# Patient Record
Sex: FEMALE | Race: WHITE | NOT HISPANIC OR LATINO | Employment: UNEMPLOYED | URBAN - METROPOLITAN AREA
[De-identification: names, ages, dates, MRNs, and addresses within clinical notes are randomized per-mention and may not be internally consistent; named-entity substitution may affect disease eponyms.]

---

## 2017-09-19 ENCOUNTER — OFFICE VISIT (OUTPATIENT)
Dept: INTERNAL MEDICINE | Facility: CLINIC | Age: 52
End: 2017-09-19

## 2017-09-19 VITALS
DIASTOLIC BLOOD PRESSURE: 80 MMHG | BODY MASS INDEX: 26.2 KG/M2 | TEMPERATURE: 98.4 F | HEIGHT: 66 IN | WEIGHT: 163 LBS | SYSTOLIC BLOOD PRESSURE: 122 MMHG

## 2017-09-19 DIAGNOSIS — K42.9 UMBILICAL HERNIA WITHOUT OBSTRUCTION AND WITHOUT GANGRENE: ICD-10-CM

## 2017-09-19 DIAGNOSIS — J30.89 SEASONAL ALLERGIC RHINITIS DUE TO OTHER ALLERGIC TRIGGER: Primary | ICD-10-CM

## 2017-09-19 PROCEDURE — 99203 OFFICE O/P NEW LOW 30 MIN: CPT | Performed by: INTERNAL MEDICINE

## 2017-09-19 NOTE — PROGRESS NOTES
Subjective   Leyla Walsh is a 51 y.o. female here to establish care for seasonal allergies and umbilical hernia. Well-controlled on prn antihistamine. Umbilical hernia has been present for years, came with children (3). She is fairly active and likes to go hiking, but the hernia will become more pronounced and painful. It can also happen when bending down or flexing her abd muscles. Would like to get this fixed as it is impacting her life. It has always been easily reducible by shift in position by pt.    Review of Systems   Constitutional: Negative.    HENT: Negative.    Eyes: Negative.    Respiratory: Negative.    Cardiovascular: Negative.    Gastrointestinal:        Hernia   Endocrine: Negative.    Genitourinary: Negative.    Musculoskeletal: Negative.    Skin: Negative.    Allergic/Immunologic: Negative.    Neurological: Negative.    Hematological: Negative.    Psychiatric/Behavioral: Negative.        Past Medical History:   Diagnosis Date   • Cancer     basal cell x's3   • Fractures     R wrist   • Urinary tract infection      Family History   Problem Relation Age of Onset   • Hypertension Mother    • Hyperlipidemia Mother    • Cancer Father      basal cell   • Stroke Father    • Migraines Sister    • Stroke Maternal Grandmother    • Osteoporosis Maternal Grandmother      Past Surgical History:   Procedure Laterality Date   • BASAL CELL CARCINOMA EXCISION  11/2015    face   • BASAL CELL CARCINOMA EXCISION  10/2015    face     Social History     Social History   • Marital status:      Spouse name: N/A   • Number of children: N/A   • Years of education: N/A     Occupational History   • Not on file.     Social History Main Topics   • Smoking status: Never Smoker   • Smokeless tobacco: Never Used   • Alcohol use Yes   • Drug use: No   • Sexual activity: Yes     Partners: Male     Birth control/ protection: Condom     Other Topics Concern   • Not on file     Social History Narrative   • No narrative on file  "        Current Outpatient Prescriptions:   •  cetirizine (ZyrTEC) 10 MG tablet, Take 10 mg by mouth daily., Disp: , Rfl:     Objective   /80 (BP Location: Right arm, Patient Position: Sitting, Cuff Size: Adult)  Temp 98.4 °F (36.9 °C) (Temporal Artery )   Ht 66\" (167.6 cm)  Wt 163 lb (73.9 kg)  BMI 26.31 kg/m2  Physical Exam   Constitutional: She is oriented to person, place, and time. She appears well-developed and well-nourished.   HENT:   Head: Normocephalic and atraumatic.   Cardiovascular: Normal rate, regular rhythm and normal heart sounds.  Exam reveals no gallop and no friction rub.    No murmur heard.  Pulmonary/Chest: Effort normal and breath sounds normal. She has no wheezes.   Abdominal: Soft. She exhibits no distension. There is no tenderness. A hernia is present.   Small umbilical hernia present, easily reducible   Musculoskeletal:   Normal gait and station   Neurological: She is alert and oriented to person, place, and time.   Skin: Skin is warm and dry.   Psychiatric: She has a normal mood and affect. Her behavior is normal. Judgment and thought content normal.   Vitals reviewed.      Assessment/Plan   Leyla was seen today for establish care.    Diagnoses and all orders for this visit:    Seasonal allergic rhinitis due to other allergic trigger  -continue antihistamine prn    Umbilical hernia without obstruction and without gangrene  -     Ambulatory Referral to General Surgery           "

## 2024-08-12 ENCOUNTER — OFFICE VISIT (OUTPATIENT)
Dept: CARDIOLOGY | Facility: CLINIC | Age: 59
End: 2024-08-12
Payer: COMMERCIAL

## 2024-08-12 VITALS
OXYGEN SATURATION: 99 % | HEART RATE: 60 BPM | WEIGHT: 153.7 LBS | DIASTOLIC BLOOD PRESSURE: 70 MMHG | SYSTOLIC BLOOD PRESSURE: 140 MMHG | BODY MASS INDEX: 24.7 KG/M2 | HEIGHT: 66 IN

## 2024-08-12 DIAGNOSIS — I70.0 ATHEROSCLEROSIS OF ABDOMINAL AORTA: Primary | ICD-10-CM

## 2024-08-12 PROCEDURE — 99204 OFFICE O/P NEW MOD 45 MIN: CPT | Performed by: INTERNAL MEDICINE

## 2024-08-12 PROCEDURE — 93000 ELECTROCARDIOGRAM COMPLETE: CPT | Performed by: INTERNAL MEDICINE

## 2024-08-12 RX ORDER — OMEPRAZOLE 20 MG/1
CAPSULE, DELAYED RELEASE ORAL
COMMUNITY
Start: 2024-07-24

## 2024-08-12 RX ORDER — LISINOPRIL 10 MG/1
1 TABLET ORAL DAILY
COMMUNITY
Start: 2024-06-10

## 2024-08-12 RX ORDER — DIPHENOXYLATE HYDROCHLORIDE AND ATROPINE SULFATE 2.5; .025 MG/1; MG/1
1 TABLET ORAL DAILY
COMMUNITY

## 2024-08-12 NOTE — PROGRESS NOTES
"Chief Complaint  Establish Care (New Patient)      Subjective   History of Present Illness    Problem List  -Atherosclerosis of the aorta  -HTN  -HLD  -EKG, sinus steven    Mrs. Walsh is a 58 year old who is being seen for above.  Asymptomatic.  BP well controlled at home.  Incidental atherosclerosis of aorta noted on abdominal CT.  Recent lifestyle changes and lipids have improved.  ROS negative except for the above.        Objective   Vital Signs:  Vitals:    08/12/24 0953   BP: 140/70   Pulse: 60   SpO2: 99%     Estimated body mass index is 24.81 kg/m² as calculated from the following:    Height as of this encounter: 167.6 cm (66\").    Weight as of this encounter: 69.7 kg (153 lb 11.2 oz).       Physical Exam  HENT:      Head: Normocephalic.   Eyes:      Extraocular Movements: Extraocular movements intact.   Cardiovascular:      Rate and Rhythm: Normal rate and regular rhythm.      Heart sounds: No murmur heard.     No gallop.   Pulmonary:      Breath sounds: Normal breath sounds.   Abdominal:      Palpations: Abdomen is soft.   Musculoskeletal:      Right lower leg: No edema.      Left lower leg: No edema.   Skin:     General: Skin is warm and dry.   Neurological:      General: No focal deficit present.      Mental Status: She is alert.   Psychiatric:         Mood and Affect: Mood normal.           ECG 12 Lead    Date/Time: 8/12/2024 11:03 AM  Performed by: Yoandy Glass MD    Authorized by: Yoandy Glass MD  Rhythm: sinus bradycardia    Clinical impression: normal ECG       Clinic discussed advanced directive    Assessment   -Atherosclerosis of the aorta  -HTN  -HLD  -EKG, sinus steven    Plan   -AAA eval by US  -discussed CT calcium score, declined  -will get blood work from PCP office.  Recent lipid panel    Return in about 1 year (around 8/12/2025).  Yoandy Glass MD  08/12/2024 11:03 EDT     "

## 2024-08-27 ENCOUNTER — HOSPITAL ENCOUNTER (OUTPATIENT)
Facility: HOSPITAL | Age: 59
Discharge: HOME | End: 2024-08-27
Admitting: INTERNAL MEDICINE
Payer: COMMERCIAL

## 2024-08-27 DIAGNOSIS — I70.0 ATHEROSCLEROSIS OF ABDOMINAL AORTA: ICD-10-CM

## 2024-08-27 PROCEDURE — 76706 US ABDL AORTA SCREEN AAA: CPT

## 2024-11-04 ENCOUNTER — OFFICE VISIT (OUTPATIENT)
Dept: SLEEP MEDICINE | Age: 59
End: 2024-11-04
Payer: COMMERCIAL

## 2024-11-04 VITALS
HEIGHT: 66 IN | DIASTOLIC BLOOD PRESSURE: 80 MMHG | SYSTOLIC BLOOD PRESSURE: 128 MMHG | OXYGEN SATURATION: 100 % | BODY MASS INDEX: 25.01 KG/M2 | HEART RATE: 60 BPM | WEIGHT: 155.6 LBS | TEMPERATURE: 98.3 F

## 2024-11-04 DIAGNOSIS — I10 ESSENTIAL HYPERTENSION: ICD-10-CM

## 2024-11-04 DIAGNOSIS — G47.33 OBSTRUCTIVE SLEEP APNEA, ADULT: Primary | ICD-10-CM

## 2024-11-04 DIAGNOSIS — R06.83 SNORING: ICD-10-CM

## 2024-11-04 PROBLEM — K21.9 GASTROESOPHAGEAL REFLUX DISEASE WITHOUT ESOPHAGITIS: Status: ACTIVE | Noted: 2024-11-04

## 2024-11-04 PROCEDURE — 99244 OFF/OP CNSLTJ NEW/EST MOD 40: CPT | Performed by: INTERNAL MEDICINE

## 2024-11-04 NOTE — PROGRESS NOTES
Leyla Walsh is a 58 y.o. female.   Chief Complaint   Patient presents with    Snoring    Dry Mouth    Unable To Stay Asleep    new patient       HPI     58 y.o. female seen in consultation at the request of Mildred Cortes MD for evaluation of the above.     She is here primarily at the behest of her family and her dentist.  She has a longstanding history of loud snoring.  Her dentist has noted evidence of reflux and suspects obstructive sleep apnea based upon her oropharyngeal exam.  Her  notes heavy snoring but has not noted any apneas.    From the patient standpoint she typically is in bed from 11 PM until 6-7 a.m.  She fell asleep within 5 minutes and will sleep an average of 7 hours per night.  She never naps during the day.  She says her sleep is undisturbed but she describes sometimes awakening in early morning hours and having difficulty getting back to sleep.  She describes a stressful home situation with family.    She denies any RLS type symptoms.  She does have bruxism.  She does awaken with a dry mouth.  She is being treated for GERD and hypertension.    [place] Scale is: 5/24    The patient's relevant past medical, surgical, family, and social history reviewed and updated in Epic as appropriate.    Current medications are:   Current Outpatient Medications:     cetirizine (ZyrTEC) 10 MG tablet, Take 1 tablet by mouth As Needed., Disp: , Rfl:     lisinopril (PRINIVIL,ZESTRIL) 10 MG tablet, Take 1 tablet by mouth Daily., Disp: , Rfl:     multivitamin (MULTI-VITAMIN DAILY PO), Take 1 tablet by mouth Daily., Disp: , Rfl:     omeprazole (priLOSEC) 20 MG capsule, TAKE 1 CAPSULE BY MOUTH EVERY DAY FOR 30 DAYS AND THEN TAKE 1 CAPSULE BY MOUTH EVERY OTHER DAY UNTIL GONE, Disp: , Rfl: .    Review of Systems    Review of Systems  ROS documented in patient questionnaire ×14 systems.  Reviewed with patient.  Otherwise negative except as noted in HPI.    Physical Exam    Blood pressure 128/80, pulse  "60, temperature 98.3 °F (36.8 °C), temperature source Infrared, height 167.6 cm (66\"), weight 70.6 kg (155 lb 9.6 oz), SpO2 100%, not currently breastfeeding. Body mass index is 25.11 kg/m².    Physical Exam  Vitals and nursing note reviewed.   Constitutional:       Appearance: Normal appearance. She is well-developed.   HENT:      Head: Normocephalic and atraumatic.      Nose: Nose normal.      Mouth/Throat:      Mouth: Mucous membranes are moist.      Pharynx: Oropharynx is clear. No oropharyngeal exudate.      Comments: Class II airway  Broad tongue  Eyes:      General: No scleral icterus.     Conjunctiva/sclera: Conjunctivae normal.   Neck:      Thyroid: No thyromegaly.      Trachea: No tracheal deviation.   Cardiovascular:      Rate and Rhythm: Normal rate and regular rhythm.      Heart sounds: No murmur heard.     No friction rub. No gallop.   Pulmonary:      Effort: Pulmonary effort is normal. No respiratory distress.      Breath sounds: No wheezing or rales.   Musculoskeletal:         General: No deformity. Normal range of motion.   Skin:     General: Skin is warm and dry.      Findings: No rash.   Neurological:      Mental Status: She is alert and oriented to person, place, and time.   Psychiatric:         Behavior: Behavior normal.         Thought Content: Thought content normal.         DATA:    Reviewed 7/24/2024 note from Dr. Cortes    Reviewed bed partner questionnaire    ASSESSMENT:    Problem List Items Addressed This Visit       Essential hypertension     Other Visit Diagnoses       Obstructive sleep apnea, adult    -  Primary    Relevant Orders    Home Sleep Study    Snoring        Relevant Orders    Home Sleep Study            58-year-old female referred to the sleep center due to the suspicion of sleep disordered breathing/obstructive sleep apnea.  She has been noted to snore loudly but is mostly asymptomatic otherwise.  I do note that she does have some disturbed sleep that has features of sleep " maintenance insomnia.  She does have an elevated STOP-BANG score of 3 putting her at high risk for the presence of obstructive sleep apnea based primarily upon her loud snoring, age, and hypertension.    I discussed the possibility of obstructive sleep apnea and how this may be affecting her short and long-term health and she was agreeable to proceed with testing and we discussed treatment options as well.  Details as below.    PLAN:    - Home sleep apnea testing.  - Diagnostic process and potential treatment options discussed and questions/concerns addressed.  - Long-term cardiovascular and metabolic risks of untreated obstructive sleep apnea reviewed with the patient.  - Patient was agreeable to a trial of CPAP therapy on an initial trial basis if recommended after testing complete.  - Sleep center follow-up.    I have reviewed the results of my evaluation and impression and discussed my recommendations in detail with the patient.    Signed by  Sylvester Luo MD    November 4, 2024      CC: Mildred Cortes MD Henkel, Mary H, MD

## 2025-01-08 ENCOUNTER — HOSPITAL ENCOUNTER (OUTPATIENT)
Dept: SLEEP MEDICINE | Facility: HOSPITAL | Age: 60
Discharge: HOME | End: 2025-01-08
Admitting: INTERNAL MEDICINE
Payer: COMMERCIAL

## 2025-01-08 VITALS — HEIGHT: 66 IN | BODY MASS INDEX: 24.91 KG/M2 | WEIGHT: 155 LBS

## 2025-01-08 DIAGNOSIS — R06.83 SNORING: ICD-10-CM

## 2025-01-08 DIAGNOSIS — G47.33 OBSTRUCTIVE SLEEP APNEA, ADULT: ICD-10-CM

## 2025-01-08 PROCEDURE — G0399 HOME SLEEP TEST/TYPE 3 PORTA: HCPCS

## 2025-02-04 ENCOUNTER — TELEPHONE (OUTPATIENT)
Dept: SLEEP MEDICINE | Age: 60
End: 2025-02-04
Payer: COMMERCIAL

## 2025-02-07 ENCOUNTER — TELEPHONE (OUTPATIENT)
Dept: SLEEP MEDICINE | Age: 60
End: 2025-02-07
Payer: COMMERCIAL

## 2025-02-07 DIAGNOSIS — G47.33 SEVERE OBSTRUCTIVE SLEEP APNEA: Primary | ICD-10-CM

## 2025-02-07 NOTE — TELEPHONE ENCOUNTER
Patient called in to ask if she could switch the location for her order of the c pap machine and supplies.  She states that she would like the order to go to   [facility]  [address] [place] [address] [address], [place] [address] [phone]

## 2025-05-07 NOTE — PROGRESS NOTES
Sleep Clinic Follow Up Note    Chief Complaint  Sleeping Problem and Sleep Apnea (Follow up)    Subjective     History of Present Illness (from previous encounter on 11/4/2024 with Dr. Luo):  She is here primarily at the behest of her family and her dentist.  She has a longstanding history of loud snoring.  Her dentist has noted evidence of reflux and suspects obstructive sleep apnea based upon her oropharyngeal exam.  Her  notes heavy snoring but has not noted any apneas.     From the patient standpoint she typically is in bed from 11 PM until 6-7 a.m.  She fell asleep within 5 minutes and will sleep an average of 7 hours per night.  She never naps during the day.  She says her sleep is undisturbed but she describes sometimes awakening in early morning hours and having difficulty getting back to sleep.  She describes a stressful home situation with family.     She denies any RLS type symptoms.  She does have bruxism.  She does awaken with a dry mouth.  She is being treated for GERD and hypertension.     [place] Scale is: 5/24 (End copied text).    -A home sleep test was obtained on 1/10/2025 revealing severe obstructive sleep apnea with an AHI of 30.1/h.     Interval History:  Leyla Walsh is a 59 y.o. female returns for follow up and compliance of PAP therapy. The patient was last seen on 11/4/2024 by Dr. Luo. Overall the patient feels good with regard to therapy. The device appears to be working appropriately. On average the patient sleeps 6 hours per night. The patient wakes 0-1 times per night.     The patient reports the following changes to their medical and medication history since they were last seen:  None    Further details are as follows:      [place] Scale is (out of 24): Total score: 5     Weight:  Current Weight: 156 lb    Weight change in the last year:  gain: 0 lbs    The patient's relevant past medical, surgical, family, and social history reviewed and updated in Epic as  "appropriate.    PMH:    Past Medical History:   Diagnosis Date    Acid reflux     Cancer     basal cell x's3    Fractures     R wrist    History of CT scan 2024    abdominal    Hyperlipidemia     Hypertension     Urinary tract infection      Past Surgical History:   Procedure Laterality Date    BASAL CELL CARCINOMA EXCISION  2015    face    BASAL CELL CARCINOMA EXCISION  10/2015    face    UMBILICAL HERNIA REPAIR       OB History          4    Para   3    Term   3            AB   1    Living   3         SAB        IAB   1    Ectopic        Molar        Multiple        Live Births              Obstetric Comments    - Shiv (trumpet, ? ?,   - Alex ([place] immersion   - Lora             No Known Allergies    MEDS:  Prior to Admission medications    Medication Sig Start Date End Date Taking? Authorizing Provider   cetirizine (ZyrTEC) 10 MG tablet Take 1 tablet by mouth As Needed.    Jose De Jesus Driscoll MD   lisinopril (PRINIVIL,ZESTRIL) 10 MG tablet Take 1 tablet by mouth Daily. 6/10/24   Jose DeJ esus Driscoll MD   multivitamin (MULTI-VITAMIN DAILY PO) Take 1 tablet by mouth Daily.    Jose De Jesus Driscoll MD   omeprazole (priLOSEC) 20 MG capsule TAKE 1 CAPSULE BY MOUTH EVERY DAY FOR 30 DAYS AND THEN TAKE 1 CAPSULE BY MOUTH EVERY OTHER DAY UNTIL GONE 24   Jose De Jesus Driscoll MD         FH:  Family History   Problem Relation Age of Onset    Hypertension Mother     Hyperlipidemia Mother     Cancer Father         basal cell    Stroke Father     Migraines Sister     Hypertension Brother     Hyperlipidemia Brother     Stroke Maternal Grandmother     Osteoporosis Maternal Grandmother        Objective   Vital Signs:  /68 (BP Location: Left arm, Patient Position: Sitting, Cuff Size: Adult)   Pulse 76   Temp 97.3 °F (36.3 °C) (Temporal)   Ht 167.6 cm (65.98\")   Wt 70.9 kg (156 lb 6.4 oz)   SpO2 99%   BMI 25.26 kg/m²     Patient's (Body mass index is 25.26 kg/m².) " indicates that they are overweight (BMI 25-29.9)         Physical Exam  Vitals reviewed.   Constitutional:       Appearance: Normal appearance.   HENT:      Head: Normocephalic and atraumatic.      Nose: Nose normal.      Mouth/Throat:      Mouth: Mucous membranes are moist.   Cardiovascular:      Rate and Rhythm: Normal rate and regular rhythm.      Heart sounds: No murmur heard.     No friction rub. No gallop.   Pulmonary:      Effort: Pulmonary effort is normal. No respiratory distress.      Breath sounds: Normal breath sounds. No wheezing or rhonchi.   Neurological:      Mental Status: She is alert and oriented to person, place, and time.   Psychiatric:         Behavior: Behavior normal.               Result Review :           PAP Report:  AHI: 19.5/h  Days of Usage: 39/45 (87%)  Number of Days Greater than 4 hours: 87%  Average time (days used): 5 hours 2 minutes  95th Percentile Pressure: 17 cmH2O  95th percentile leaks: 34 L/min  Settings: Auto CPAP-8/18 cm H2O, EPR full-time, EPR level 3, response standard.       Assessment and Plan  Leyla Walsh is a 59 y.o. female who returns for follow-up and compliance of PAP therapy.  The Pap report has been reviewed.  Overall usage and compliance are 87%.  The patient averages 5 hours and 2 minutes of therapy.  AHI is now at 19.5/h, down from 30.1/h noted on her sleep study.  I note a moderate leak at 34 L/min, and central index at 10.1 with obstructive at 6.6.  Mask fitting/change might be helpful.  We may need to consider titration study. She had a mask leak, and has tried another mask which is not bothering her as much. She is going to try a nasal mask.     I will refill the patient's supplies, and I have asked them to return for follow-up and to check download in about 3 months. She does feel improved with use of the device.     Diagnoses and all orders for this visit:    1. Obstructive sleep apnea, adult (Primary)  -     PAP Therapy    2. Overweight with  body mass index (BMI) 25.0-29.9         The patient continues to use and benefit from PAP therapy.    1. The patient was counseled regarding multimodal approach with healthy nutrition, healthy sleep, regular physical activity, social activities, counseling, and medications. Encouraged to practice lateral sleep position. Avoid alcohol and sedatives close to bedtime.     2.  We will refill supplies x1 year.  Return to clinic 1 year or sooner if symptoms warrant. I have reviewed the results of my evaluation and impression and discussed my recommendations in detail with the patient.           Follow Up  Return in about 3 months (around 8/13/2025).  Patient was given instructions and counseling regarding her condition or for health maintenance advice. Please see specific information pulled into the AVS if appropriate.       SIRIA Casarez, ACNP-BC  Pulmonology, Critical Care, and Sleep Medicine

## 2025-05-13 ENCOUNTER — OFFICE VISIT (OUTPATIENT)
Dept: SLEEP MEDICINE | Age: 60
End: 2025-05-13
Payer: COMMERCIAL

## 2025-05-13 VITALS
WEIGHT: 156.4 LBS | OXYGEN SATURATION: 99 % | HEART RATE: 76 BPM | BODY MASS INDEX: 25.13 KG/M2 | TEMPERATURE: 97.3 F | DIASTOLIC BLOOD PRESSURE: 68 MMHG | HEIGHT: 66 IN | SYSTOLIC BLOOD PRESSURE: 130 MMHG

## 2025-05-13 DIAGNOSIS — G47.33 OBSTRUCTIVE SLEEP APNEA, ADULT: Primary | ICD-10-CM

## 2025-05-13 DIAGNOSIS — E66.3 OVERWEIGHT WITH BODY MASS INDEX (BMI) 25.0-29.9: ICD-10-CM

## 2025-08-13 ENCOUNTER — OFFICE VISIT (OUTPATIENT)
Dept: SLEEP MEDICINE | Age: 60
End: 2025-08-13
Payer: COMMERCIAL

## 2025-08-13 VITALS
BODY MASS INDEX: 24.75 KG/M2 | HEIGHT: 66 IN | WEIGHT: 154 LBS | DIASTOLIC BLOOD PRESSURE: 70 MMHG | HEART RATE: 66 BPM | TEMPERATURE: 96.6 F | SYSTOLIC BLOOD PRESSURE: 120 MMHG | OXYGEN SATURATION: 100 %

## 2025-08-13 DIAGNOSIS — G47.33 OBSTRUCTIVE SLEEP APNEA, ADULT: Primary | ICD-10-CM

## 2025-08-13 DIAGNOSIS — E66.3 OVERWEIGHT WITH BODY MASS INDEX (BMI) 25.0-29.9: ICD-10-CM

## 2025-08-27 ENCOUNTER — TELEPHONE (OUTPATIENT)
Dept: SLEEP MEDICINE | Age: 60
End: 2025-08-27
Payer: COMMERCIAL